# Patient Record
Sex: MALE | Race: WHITE | Employment: FULL TIME | URBAN - NONMETROPOLITAN AREA
[De-identification: names, ages, dates, MRNs, and addresses within clinical notes are randomized per-mention and may not be internally consistent; named-entity substitution may affect disease eponyms.]

---

## 2017-12-19 ENCOUNTER — APPOINTMENT (OUTPATIENT)
Dept: GENERAL RADIOLOGY | Age: 57
End: 2017-12-19
Payer: COMMERCIAL

## 2017-12-19 ENCOUNTER — HOSPITAL ENCOUNTER (EMERGENCY)
Age: 57
Discharge: HOME | End: 2017-12-19
Attending: FAMILY MEDICINE
Payer: COMMERCIAL

## 2017-12-19 VITALS
TEMPERATURE: 98.6 F | RESPIRATION RATE: 17 BRPM | BODY MASS INDEX: 29.8 KG/M2 | DIASTOLIC BLOOD PRESSURE: 94 MMHG | SYSTOLIC BLOOD PRESSURE: 131 MMHG | WEIGHT: 220 LBS | HEART RATE: 74 BPM | OXYGEN SATURATION: 98 % | HEIGHT: 72 IN

## 2017-12-19 DIAGNOSIS — R07.89 ATYPICAL CHEST PAIN: Primary | ICD-10-CM

## 2017-12-19 LAB
ALBUMIN SERPL-MCNC: 4.4 G/DL (ref 3.5–5.1)
ALP BLD-CCNC: 79 U/L (ref 38–126)
ALT SERPL-CCNC: 23 U/L (ref 11–66)
ANION GAP SERPL CALCULATED.3IONS-SCNC: 17 MEQ/L (ref 8–16)
APTT: 28.1 SECONDS (ref 22–38)
AST SERPL-CCNC: 23 U/L (ref 5–40)
BASOPHILS # BLD: 0.5 %
BASOPHILS ABSOLUTE: 0 THOU/MM3 (ref 0–0.1)
BILIRUB SERPL-MCNC: 0.5 MG/DL (ref 0.3–1.2)
BUN BLDV-MCNC: 9 MG/DL (ref 7–22)
CALCIUM SERPL-MCNC: 9.5 MG/DL (ref 8.5–10.5)
CHLORIDE BLD-SCNC: 97 MEQ/L (ref 98–111)
CO2: 23 MEQ/L (ref 23–33)
CREAT SERPL-MCNC: 0.9 MG/DL (ref 0.4–1.2)
D-DIMER QUANTITATIVE: 270 NG/ML FEU (ref 0–500)
EOSINOPHIL # BLD: 1.9 %
EOSINOPHILS ABSOLUTE: 0.1 THOU/MM3 (ref 0–0.4)
GFR SERPL CREATININE-BSD FRML MDRD: 86 ML/MIN/1.73M2
GLUCOSE BLD-MCNC: 108 MG/DL (ref 70–108)
HCT VFR BLD CALC: 47.3 % (ref 42–52)
HEMOGLOBIN: 16.2 GM/DL (ref 14–18)
INR BLD: 0.96 (ref 0.85–1.13)
LYMPHOCYTES # BLD: 15.2 %
LYMPHOCYTES ABSOLUTE: 0.9 THOU/MM3 (ref 1–4.8)
MCH RBC QN AUTO: 31.2 PG (ref 27–31)
MCHC RBC AUTO-ENTMCNC: 34.3 GM/DL (ref 33–37)
MCV RBC AUTO: 91.2 FL (ref 80–94)
MONOCYTES # BLD: 12.7 %
MONOCYTES ABSOLUTE: 0.8 THOU/MM3 (ref 0.4–1.3)
NUCLEATED RED BLOOD CELLS: 0 /100 WBC
OSMOLALITY CALCULATION: 273 MOSMOL/KG (ref 275–300)
PDW BLD-RTO: 13.1 % (ref 11.5–14.5)
PLATELET # BLD: 207 THOU/MM3 (ref 130–400)
PMV BLD AUTO: 9 MCM (ref 7.4–10.4)
POTASSIUM SERPL-SCNC: 3.9 MEQ/L (ref 3.5–5.2)
PRO-BNP: 92.2 PG/ML (ref 0–900)
RBC # BLD: 5.18 MILL/MM3 (ref 4.7–6.1)
SEG NEUTROPHILS: 69.7 %
SEGMENTED NEUTROPHILS ABSOLUTE COUNT: 4.3 THOU/MM3 (ref 1.8–7.7)
SODIUM BLD-SCNC: 137 MEQ/L (ref 135–145)
TOTAL PROTEIN: 7.4 G/DL (ref 6.1–8)
TROPONIN T: < 0.01 NG/ML
TROPONIN T: < 0.01 NG/ML
WBC # BLD: 6.1 THOU/MM3 (ref 4.8–10.8)

## 2017-12-19 PROCEDURE — 96360 HYDRATION IV INFUSION INIT: CPT

## 2017-12-19 PROCEDURE — 2580000003 HC RX 258: Performed by: FAMILY MEDICINE

## 2017-12-19 PROCEDURE — 85610 PROTHROMBIN TIME: CPT

## 2017-12-19 PROCEDURE — 93005 ELECTROCARDIOGRAM TRACING: CPT

## 2017-12-19 PROCEDURE — 85025 COMPLETE CBC W/AUTO DIFF WBC: CPT

## 2017-12-19 PROCEDURE — 83880 ASSAY OF NATRIURETIC PEPTIDE: CPT

## 2017-12-19 PROCEDURE — 36415 COLL VENOUS BLD VENIPUNCTURE: CPT

## 2017-12-19 PROCEDURE — 99285 EMERGENCY DEPT VISIT HI MDM: CPT

## 2017-12-19 PROCEDURE — 84484 ASSAY OF TROPONIN QUANT: CPT

## 2017-12-19 PROCEDURE — 85730 THROMBOPLASTIN TIME PARTIAL: CPT

## 2017-12-19 PROCEDURE — 71020 XR CHEST STANDARD TWO VW: CPT

## 2017-12-19 PROCEDURE — 80053 COMPREHEN METABOLIC PANEL: CPT

## 2017-12-19 PROCEDURE — 85379 FIBRIN DEGRADATION QUANT: CPT

## 2017-12-19 PROCEDURE — 96361 HYDRATE IV INFUSION ADD-ON: CPT

## 2017-12-19 RX ORDER — ASPIRIN 81 MG/1
81 TABLET, CHEWABLE ORAL DAILY
COMMUNITY
End: 2018-03-20

## 2017-12-19 RX ORDER — 0.9 % SODIUM CHLORIDE 0.9 %
1000 INTRAVENOUS SOLUTION INTRAVENOUS ONCE
Status: COMPLETED | OUTPATIENT
Start: 2017-12-19 | End: 2017-12-19

## 2017-12-19 RX ADMIN — SODIUM CHLORIDE 1000 ML: 9 INJECTION, SOLUTION INTRAVENOUS at 17:48

## 2017-12-19 ASSESSMENT — ENCOUNTER SYMPTOMS
WHEEZING: 0
ABDOMINAL PAIN: 0
SORE THROAT: 0
BACK PAIN: 0
VOMITING: 0
EYE REDNESS: 0
DIARRHEA: 0
COUGH: 0
RHINORRHEA: 0
EYE DISCHARGE: 0
SHORTNESS OF BREATH: 0
NAUSEA: 0

## 2017-12-19 NOTE — ED PROVIDER NOTES
no discharge. Left eye exhibits no discharge. No scleral icterus. Neck: Normal range of motion. Neck supple. No JVD present. Cardiovascular: Normal rate, regular rhythm, normal heart sounds and intact distal pulses. Exam reveals no gallop and no friction rub. No murmur heard. Pulmonary/Chest: Effort normal. No stridor. No respiratory distress. He has no wheezes. He has no rales. Abdominal: Soft. He exhibits no distension and no mass. There is no tenderness. There is no rebound and no guarding. Musculoskeletal: Normal range of motion. He exhibits no edema. Neurological: He is alert and oriented to person, place, and time. He exhibits normal muscle tone. GCS eye subscore is 4. GCS verbal subscore is 5. GCS motor subscore is 6. Skin: Skin is warm and dry. He is not diaphoretic. No erythema. Psychiatric: He has a normal mood and affect. His behavior is normal.   Nursing note and vitals reviewed. DIFFERENTIAL DIAGNOSIS:   arrhythmia     DIAGNOSTIC RESULTS     EKG: All EKG's are interpreted by the Emergency Department Physician who either signs or Co-signs this chart in the absence of a cardiologist.  EKG interpreted by Beth Browne MD:    Vent. Rate: 92 bpm  AZ interval: 206 ms  QRS duration: 96 ms  QTc: 425 ms  P-R-T axes: 58, 5, 36  Normal sinus rhythm. No STEMI      RADIOLOGY: non-plain film images(s) such as CT, Ultrasound and MRI are read by the radiologist.    XR CHEST STANDARD (2 VW)   Final Result   1. Patchy airspace disease overlying the lateral right lung base may be related to mild atelectasis versus scarring or or mild pneumonia. No other acute findings are otherwise demonstrated. **This report has been created using voice recognition software. It may contain minor errors which are inherent in voice recognition technology. **      Final report electronically signed by Dr. Rhoda Chacko on 12/19/2017 6:16 PM          LABS:   Labs Reviewed   CBC WITH AUTO DIFFERENTIAL - Abnormal; Notable for the following:        Result Value    MCH 31.2 (*)     Lymphocytes # 0.9 (*)     All other components within normal limits   COMPREHENSIVE METABOLIC PANEL - Abnormal; Notable for the following:     Chloride 97 (*)     All other components within normal limits   ANION GAP - Abnormal; Notable for the following: Anion Gap 17.0 (*)     All other components within normal limits   GLOMERULAR FILTRATION RATE, ESTIMATED - Abnormal; Notable for the following:     Est, Glom Filt Rate 86 (*)     All other components within normal limits   OSMOLALITY - Abnormal; Notable for the following:     Osmolality Calc 273.0 (*)     All other components within normal limits   APTT   TROPONIN   BRAIN NATRIURETIC PEPTIDE   PROTIME-INR   D-DIMER, QUANTITATIVE   TROPONIN       EMERGENCY DEPARTMENT COURSE:   Vitals:    Vitals:    12/19/17 1750 12/19/17 2050   BP: (!) 131/94    Pulse: 90 74   Resp: 14 17   Temp: 98.6 °F (37 °C)    TempSrc: Oral    SpO2: 96% 98%   Weight: 220 lb (99.8 kg)    Height: 6' (1.829 m)        5:46 PM: The patient was seen and evaluated. MDM:    Patient presented to the emergency department with an episode of palpitations that is now completely resolved. This being the second episode he is having and 6 months it might be that he is having a type of arrhythmia. 2 sets of troponin are negative. Patient may benefit from cardiology follow-up and further evaluation and treatment. We made arrangements for him to follow up with cardiology. He will follow up on Thursday 10:30 AM.       FINAL IMPRESSION      1. Atypical chest pain          DISPOSITION/PLAN     Patient is discharged.     PATIENT REFERRED TO:  [facility] [address] [place] [address]  [phone]    on Thursday at 10:38 AM      DISCHARGE MEDICATIONS:  New Prescriptions    No medications on file       (Please note that portions of this note were completed with a voice recognition program. Efforts were made to edit the dictations but occasionally words are mis-transcribed.)    Scribe:  Fabrice Bernal   12/19/17 5:46 PM Scribing for and in the presence of Aroldo Richardson MD.    Signed by: Gilbert Landry, 12/19/17 9:28 PM    Provider:  I personally performed the services described in the documentation, reviewed and edited the documentation which was dictated to the scribe in my presence, and it accurately records my words and actions.     Aroldo Richardson MD 12/19/17 9:28 PM                          Aroldo Richardson MD  12/19/17 [id number]

## 2017-12-20 LAB
EKG ATRIAL RATE: 92 BPM
EKG P AXIS: 58 DEGREES
EKG P-R INTERVAL: 206 MS
EKG Q-T INTERVAL: 344 MS
EKG QRS DURATION: 96 MS
EKG QTC CALCULATION (BAZETT): 425 MS
EKG R AXIS: 5 DEGREES
EKG T AXIS: 36 DEGREES
EKG VENTRICULAR RATE: 92 BPM

## 2017-12-20 NOTE — ED NOTES
Pt resting on cot, family at bedside. Pt denies chest pain at this time. Updated on POC.       Sheridan Pena RN  12/19/17 2052

## 2017-12-21 ENCOUNTER — OFFICE VISIT (OUTPATIENT)
Dept: CARDIOLOGY CLINIC | Age: 57
End: 2017-12-21
Payer: COMMERCIAL

## 2017-12-21 VITALS
SYSTOLIC BLOOD PRESSURE: 132 MMHG | HEIGHT: 72 IN | WEIGHT: 224 LBS | HEART RATE: 66 BPM | BODY MASS INDEX: 30.34 KG/M2 | DIASTOLIC BLOOD PRESSURE: 78 MMHG

## 2017-12-21 DIAGNOSIS — R00.2 PALPITATION: ICD-10-CM

## 2017-12-21 DIAGNOSIS — R07.9 CHEST PAIN, UNSPECIFIED TYPE: Primary | ICD-10-CM

## 2017-12-21 DIAGNOSIS — I10 ESSENTIAL HYPERTENSION: ICD-10-CM

## 2017-12-21 PROCEDURE — G8484 FLU IMMUNIZE NO ADMIN: HCPCS | Performed by: NUCLEAR MEDICINE

## 2017-12-21 PROCEDURE — 3017F COLORECTAL CA SCREEN DOC REV: CPT | Performed by: NUCLEAR MEDICINE

## 2017-12-21 PROCEDURE — 1036F TOBACCO NON-USER: CPT | Performed by: NUCLEAR MEDICINE

## 2017-12-21 PROCEDURE — 99204 OFFICE O/P NEW MOD 45 MIN: CPT | Performed by: NUCLEAR MEDICINE

## 2017-12-21 PROCEDURE — G8417 CALC BMI ABV UP PARAM F/U: HCPCS | Performed by: NUCLEAR MEDICINE

## 2017-12-21 PROCEDURE — G8427 DOCREV CUR MEDS BY ELIG CLIN: HCPCS | Performed by: NUCLEAR MEDICINE

## 2017-12-21 RX ORDER — ALLOPURINOL 100 MG/1
100 TABLET ORAL DAILY
COMMUNITY
Start: 2017-11-27

## 2017-12-21 ASSESSMENT — ENCOUNTER SYMPTOMS
RECTAL PAIN: 0
SHORTNESS OF BREATH: 0
ANAL BLEEDING: 0
CONSTIPATION: 0
ABDOMINAL PAIN: 0
COLOR CHANGE: 0
CHEST TIGHTNESS: 0
ABDOMINAL DISTENTION: 0
BLOOD IN STOOL: 0
BACK PAIN: 0
VOMITING: 0
NAUSEA: 0
DIARRHEA: 0
PHOTOPHOBIA: 0

## 2017-12-21 NOTE — PROGRESS NOTES
[facility]  [facility] [address] [address]  Dept: [phone]  Dept Fax: [phone]  Loc: [phone]    Visit Date: 12/21/2017    Stephanie Perez is a 59 y.o. male who presents today for:  Chief Complaint   Patient presents with    New Patient     follow up from ER.  Palpitations    Jaw Pain    Chest Pain   here for the first time  Started with palpitation   Was acute  Lasted for an hour  Went tot he ER   Some jaw pain at that time  No chest pain  Some dyspnea  Some dizziness  No syncope  No known arrhythmia  Has had palpitation before  intermittent   Not frequent   Last episode June 2017   No known CAD  No cath before  No smoking   High caffeine intake   Family history of CAD      HPI:  HPI  Past Medical History:   Diagnosis Date    Gout       Past Surgical History:   Procedure Laterality Date    SHOULDER ARTHROSCOPY Right      Family History   Problem Relation Age of Onset    High Cholesterol Mother     Diabetes Father     Heart Disease Father      Bypass at 68     Social History   Substance Use Topics    Smoking status: Never Smoker    Smokeless tobacco: Never Used    Alcohol use 3.6 oz/week     6 Cans of beer per week      Current Outpatient Prescriptions   Medication Sig Dispense Refill    allopurinol (ZYLOPRIM) 100 MG tablet Take 100 mg by mouth daily       aspirin 81 MG chewable tablet Take 81 mg by mouth daily       No current facility-administered medications for this visit. No Known Allergies  Health Maintenance   Topic Date Due    Hepatitis C screen  1960    HIV screen  12/03/1973    DTaP/Tdap/Td vaccine (1 - Tdap) 12/03/1977    Lipid screen  12/03/1998    Diabetes screen  12/03/1998    Colon cancer screen colonoscopy  12/03/2008    Flu vaccine (1) 09/01/2017       Subjective:  Review of Systems   Constitutional: Positive for fatigue. Negative for chills and diaphoresis.    HENT: Negative for ear discharge, hearing loss and mouth sores. Eyes: Negative for photophobia. Respiratory: Negative for chest tightness and shortness of breath. Cardiovascular: Positive for palpitations. Negative for chest pain. Gastrointestinal: Negative for abdominal distention, abdominal pain, anal bleeding, blood in stool, constipation, diarrhea, nausea, rectal pain and vomiting. Endocrine: Negative for polyphagia. Genitourinary: Negative for enuresis, hematuria and urgency. Musculoskeletal: Negative for arthralgias, back pain, gait problem, joint swelling, myalgias, neck pain and neck stiffness. Skin: Negative for color change, pallor and rash. Allergic/Immunologic: Negative for food allergies. Neurological: Negative for dizziness, syncope and light-headedness. Hematological: Negative for adenopathy. Psychiatric/Behavioral: Negative for behavioral problems, confusion, decreased concentration, dysphoric mood and hallucinations. Objective:  Physical Exam   Constitutional: He is oriented to person, place, and time. He appears well-developed. HENT:   Head: Normocephalic. Right Ear: External ear normal.   Eyes: EOM are normal. Left eye exhibits no discharge. No scleral icterus. Neck: No JVD present. No tracheal deviation present. No thyromegaly present. Cardiovascular: Normal rate. Exam reveals no gallop and no friction rub. Murmur heard. Pulmonary/Chest: No respiratory distress. He has no wheezes. He has no rales. He exhibits no tenderness. Abdominal: He exhibits no distension and no mass. There is no tenderness. There is no rebound and no guarding. Musculoskeletal: He exhibits no edema. Lymphadenopathy:     He has no cervical adenopathy. Neurological: He is oriented to person, place, and time. No cranial nerve deficit. Psychiatric: His behavior is normal.     BP (!) 144/84   Pulse 66   Ht 6' (1.829 m)   Wt 224 lb (101.6 kg)   BMI 30.38 kg/m²     Assessment:     1.  Chest pain, unspecified type     2. Essential hypertension     3. Palpitation     intermittent arrhythmia  Possible a fib and or SVT  Risk for CAD     Plan:  No Follow-up on file. Event monitor   Echo   Cut down caffeine   Continue risk factor modification and medical management  Thank you for allowing me to participate in the care of your patient. Please don't hesitate to contact me regarding any further issues related to the patient care    Orders Placed:  No orders of the defined types were placed in this encounter. Medications Prescribed:  No orders of the defined types were placed in this encounter. Discussed use, benefit, and side effects of prescribed medications. All patient questions answered. Pt voiced understanding. Instructed to continue current medications, diet and exercise. Continue risk factor modification and medical management. Patient agreed with treatment plan. Follow up as directed.     Electronically signed by Casa Troy MD on 12/21/2017 at 10:56 AM

## 2018-01-10 DIAGNOSIS — R00.2 PALPITATION: ICD-10-CM

## 2018-01-10 DIAGNOSIS — R07.9 CHEST PAIN, UNSPECIFIED TYPE: ICD-10-CM

## 2018-01-10 DIAGNOSIS — I10 ESSENTIAL HYPERTENSION: ICD-10-CM

## 2018-03-20 ENCOUNTER — OFFICE VISIT (OUTPATIENT)
Dept: CARDIOLOGY CLINIC | Age: 58
End: 2018-03-20
Payer: COMMERCIAL

## 2018-03-20 VITALS
DIASTOLIC BLOOD PRESSURE: 78 MMHG | BODY MASS INDEX: 29.8 KG/M2 | WEIGHT: 220 LBS | HEIGHT: 72 IN | HEART RATE: 70 BPM | SYSTOLIC BLOOD PRESSURE: 134 MMHG

## 2018-03-20 DIAGNOSIS — R00.2 PALPITATION: Primary | ICD-10-CM

## 2018-03-20 PROCEDURE — G8427 DOCREV CUR MEDS BY ELIG CLIN: HCPCS | Performed by: NUCLEAR MEDICINE

## 2018-03-20 PROCEDURE — 1036F TOBACCO NON-USER: CPT | Performed by: NUCLEAR MEDICINE

## 2018-03-20 PROCEDURE — 3017F COLORECTAL CA SCREEN DOC REV: CPT | Performed by: NUCLEAR MEDICINE

## 2018-03-20 PROCEDURE — 99213 OFFICE O/P EST LOW 20 MIN: CPT | Performed by: NUCLEAR MEDICINE

## 2018-03-20 PROCEDURE — G8417 CALC BMI ABV UP PARAM F/U: HCPCS | Performed by: NUCLEAR MEDICINE

## 2018-03-20 PROCEDURE — G8484 FLU IMMUNIZE NO ADMIN: HCPCS | Performed by: NUCLEAR MEDICINE
